# Patient Record
Sex: MALE | HISPANIC OR LATINO
[De-identification: names, ages, dates, MRNs, and addresses within clinical notes are randomized per-mention and may not be internally consistent; named-entity substitution may affect disease eponyms.]

---

## 2022-06-22 PROBLEM — Z00.00 ENCOUNTER FOR PREVENTIVE HEALTH EXAMINATION: Status: ACTIVE | Noted: 2022-06-22

## 2022-06-29 ENCOUNTER — APPOINTMENT (OUTPATIENT)
Dept: ORTHOPEDIC SURGERY | Facility: CLINIC | Age: 67
End: 2022-06-29

## 2022-06-29 VITALS — BODY MASS INDEX: 34.15 KG/M2 | WEIGHT: 200 LBS | HEIGHT: 64 IN

## 2022-06-29 DIAGNOSIS — Z78.9 OTHER SPECIFIED HEALTH STATUS: ICD-10-CM

## 2022-06-29 DIAGNOSIS — Z87.891 PERSONAL HISTORY OF NICOTINE DEPENDENCE: ICD-10-CM

## 2022-06-29 DIAGNOSIS — M25.461 EFFUSION, RIGHT KNEE: ICD-10-CM

## 2022-06-29 PROCEDURE — 99204 OFFICE O/P NEW MOD 45 MIN: CPT | Mod: 25

## 2022-06-29 PROCEDURE — 20610 DRAIN/INJ JOINT/BURSA W/O US: CPT

## 2022-06-29 PROCEDURE — 99072 ADDL SUPL MATRL&STAF TM PHE: CPT

## 2022-06-29 RX ORDER — MELOXICAM 7.5 MG/1
7.5 TABLET ORAL
Qty: 30 | Refills: 0 | Status: ACTIVE | COMMUNITY
Start: 2022-06-03

## 2022-06-29 NOTE — WORK
[Sprain/Strain] : sprain/strain [Was the competent medical cause of the injury] : was the competent medical cause of the injury [Are consistent with the injury] : are consistent with the injury [Consistent with my objective findings] : consistent with my objective findings [Does not reveal pre-existing condition(s) that may affect treatment/prognosis] : does not reveal pre-existing condition(s) that may affect treatment/prognosis [No Rx restrictions] : No Rx restrictions. [I provided the services listed above] :  I provided the services listed above.

## 2022-06-29 NOTE — DISCUSSION/SUMMARY
[de-identified] : The documentation recorded by the scribe accurately reflects the service I personally performed and the decisions made by me.\par I, Timi Thomas, attest that this documentation has been prepared under the direction and in the presence of Provider Rajinder Valdes MD.\par \par The patient was seen by Rajinder Valdes MD\par

## 2022-06-29 NOTE — DATA REVIEWED
[Outside X-rays] : outside x-rays [Right] : of the right [Knee] : knee [Report was reviewed and noted in the chart] : The report was reviewed and noted in the chart [I reviewed the films/CD and agree] : I reviewed the films/CD and agree [FreeTextEntry1] : IMPRESSION: right knee\par 1. Suprapatellar joint effusion\par 2. Curvilinear ossific focus at the medial femoral condyle may be related to prior MCL avulsive injury\par Consider MRI for further assessment \par Anselmo Obrien MD  - Electronically Signed: 06-

## 2022-06-29 NOTE — ASSESSMENT
[FreeTextEntry1] : Reviewed outside xrays of the right knee from [facility] ordered by CLAIRE OSMAN MD\par Some confusion as to the DOI due to language barrier. He was advised to verify DOI for the following visit.\par Underlying pathology reviewed and treatment options discussed. \par Offered aspiration and steroid injection for the right knee. \par 06/29/22:Patient elected to proceed with steroid injection and aspiration today. \par Apply ice to affected area.\par [place] Translation provided by Miguelito Ochoa acting as scribe.\par Obtain MRI of the right knee r/o MMT. \par Questions addressed.\par

## 2022-06-29 NOTE — PHYSICAL EXAM
[Orientated] : orientated [Able to Communicate] : able to communicate [Normal Skin] : normal skin [Right] : right knee [NL (0)] : extension 0 degrees [] : no erythema [FreeTextEntry3] : Redness noted anterior upper calf without tenderness without s/s of infection.   [TWNoteComboBox7] : flexion 90 degrees

## 2022-07-09 ENCOUNTER — APPOINTMENT (OUTPATIENT)
Dept: MRI IMAGING | Facility: CLINIC | Age: 67
End: 2022-07-09

## 2022-07-13 ENCOUNTER — APPOINTMENT (OUTPATIENT)
Dept: ORTHOPEDIC SURGERY | Facility: CLINIC | Age: 67
End: 2022-07-13

## 2022-07-15 ENCOUNTER — FORM ENCOUNTER (OUTPATIENT)
Age: 67
End: 2022-07-15

## 2022-07-16 ENCOUNTER — APPOINTMENT (OUTPATIENT)
Dept: MRI IMAGING | Facility: CLINIC | Age: 67
End: 2022-07-16

## 2022-07-16 PROCEDURE — 73721 MRI JNT OF LWR EXTRE W/O DYE: CPT | Mod: RT

## 2022-07-16 PROCEDURE — 99072 ADDL SUPL MATRL&STAF TM PHE: CPT

## 2022-07-28 ENCOUNTER — APPOINTMENT (OUTPATIENT)
Dept: ORTHOPEDIC SURGERY | Facility: CLINIC | Age: 67
End: 2022-07-28

## 2023-01-04 NOTE — HISTORY OF PRESENT ILLNESS
HLD (hyperlipidemia) [Work related] : work related [Result of repetitive motion] : result of repetitive motion [9] : 9 [6] : 6 [Full time] : Work status: full time [de-identified] : W/C  6/14/22\par Right knee pain and swelling. 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 Denies trauma. Recalls in pain onset in may while working. He c/o difficulty and pain with flexion of the knee. He had outside xrays at [facility]. +nsaids use. He denies history of right knee pain. Denies f/c. He had xrays taken at [facility] of the right knee on 06/3/22. +ibuprofen use.  [FreeTextEntry3] : 6/14/22 [de-identified] :